# Patient Record
Sex: FEMALE | Race: BLACK OR AFRICAN AMERICAN | NOT HISPANIC OR LATINO | Employment: OTHER | URBAN - METROPOLITAN AREA
[De-identification: names, ages, dates, MRNs, and addresses within clinical notes are randomized per-mention and may not be internally consistent; named-entity substitution may affect disease eponyms.]

---

## 2024-01-19 ENCOUNTER — APPOINTMENT (OUTPATIENT)
Dept: OBSTETRICS AND GYNECOLOGY | Facility: CLINIC | Age: 39
End: 2024-01-19
Payer: COMMERCIAL

## 2024-02-15 ENCOUNTER — OFFICE VISIT (OUTPATIENT)
Dept: OBSTETRICS AND GYNECOLOGY | Facility: CLINIC | Age: 39
End: 2024-02-15
Payer: COMMERCIAL

## 2024-02-15 VITALS
BODY MASS INDEX: 24.39 KG/M2 | SYSTOLIC BLOOD PRESSURE: 111 MMHG | HEART RATE: 94 BPM | WEIGHT: 137.7 LBS | DIASTOLIC BLOOD PRESSURE: 80 MMHG

## 2024-02-15 DIAGNOSIS — Z01.419 WELL WOMAN EXAM: ICD-10-CM

## 2024-02-15 DIAGNOSIS — D50.9 IRON DEFICIENCY ANEMIA, UNSPECIFIED IRON DEFICIENCY ANEMIA TYPE: ICD-10-CM

## 2024-02-15 DIAGNOSIS — Z87.410 HISTORY OF CERVICAL DYSPLASIA: Primary | ICD-10-CM

## 2024-02-15 PROCEDURE — 87800 DETECT AGNT MULT DNA DIREC: CPT

## 2024-02-15 PROCEDURE — 99395 PREV VISIT EST AGE 18-39: CPT | Mod: GC

## 2024-02-15 PROCEDURE — 87624 HPV HI-RISK TYP POOLED RSLT: CPT | Mod: 59

## 2024-02-15 PROCEDURE — 88175 CYTOPATH C/V AUTO FLUID REDO: CPT | Mod: TC,GCY

## 2024-02-15 PROCEDURE — 99395 PREV VISIT EST AGE 18-39: CPT

## 2024-02-15 PROCEDURE — 87661 TRICHOMONAS VAGINALIS AMPLIF: CPT | Mod: 59

## 2024-02-15 RX ORDER — FERROUS SULFATE 325(65) MG
325 TABLET ORAL EVERY OTHER DAY
Qty: 15 TABLET | Refills: 11 | Status: SHIPPED | OUTPATIENT
Start: 2024-02-15 | End: 2025-02-14

## 2024-02-15 RX ORDER — FERROUS SULFATE 325(65) MG
325 TABLET ORAL EVERY OTHER DAY
Qty: 15 TABLET | Refills: 11 | Status: SHIPPED | OUTPATIENT
Start: 2024-02-15 | End: 2024-02-15 | Stop reason: SDUPTHER

## 2024-02-15 ASSESSMENT — ENCOUNTER SYMPTOMS
ALLERGIC/IMMUNOLOGIC NEGATIVE: 0
CONSTITUTIONAL NEGATIVE: 0
MUSCULOSKELETAL NEGATIVE: 0
NEUROLOGICAL NEGATIVE: 0
HEMATOLOGIC/LYMPHATIC NEGATIVE: 0
EYES NEGATIVE: 0
GASTROINTESTINAL NEGATIVE: 0
PSYCHIATRIC NEGATIVE: 0
ENDOCRINE NEGATIVE: 0
CARDIOVASCULAR NEGATIVE: 0
RESPIRATORY NEGATIVE: 0

## 2024-02-15 ASSESSMENT — PAIN SCALES - GENERAL: PAINLEVEL: 0-NO PAIN

## 2024-02-15 NOTE — PROGRESS NOTES
Lavonne Burden is a 38 y.o. No obstetric history on file. female who is here for a routine gyn exam. PCP = No primary care provider on file.    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  Vaccines: unsure if she has gotten gardasil; declines   Exercise/Diet: enjoys working out, particularly walking; reports working to improve diet/skip less meals  Sexual, physical, mental abuse: none    Other Concerns: wants STI testing today; asymptomatic    ROS: otherwise neg    Vitals:    02/15/24 1328   BP: 111/80   Pulse: 94     Physical Exam:    General: no acute distress  HEENT: normocephalic, atraumatic  Heart: warm and well perfused  Lungs: no excessive respiratory effort  Breast: normal breasts; nontender; no palpable masses  Abdomen: soft nontender  : normal external female genitalia, normal vagina and cervix  Extremities: moving all extremities  Neuro: awake and conversant  Psych: appropriate mood and affect    A/P    37 yo  presenting for annual gyn visit    Routine GYN care  -Normal gyn and breast exams  -STI testing today    History of abnormal paps  -Long hx of abnormal paps, 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 Most recent pap  ASC-H/HPV other +;  NILM/HPV neg  - patient previously declined repeat colpo in 2019  -Pap collected today    Iron deficiency anemia  -pt usually takes PO iron, needs new Rx  -Rx sent    Follow up in 1 year or as needed    Seen and dicussed  with Dr. Luz Castro MD PGY-1

## 2024-02-17 LAB
C TRACH RRNA SPEC QL NAA+PROBE: NEGATIVE
N GONORRHOEA DNA SPEC QL PROBE+SIG AMP: NEGATIVE
T VAGINALIS RRNA SPEC QL NAA+PROBE: NEGATIVE

## 2024-03-04 LAB
CYTOLOGY CMNT CVX/VAG CYTO-IMP: NORMAL
HPV HR 12 DNA GENITAL QL NAA+PROBE: NEGATIVE
HPV HR GENOTYPES PNL CVX NAA+PROBE: NEGATIVE
HPV16 DNA SPEC QL NAA+PROBE: NEGATIVE
HPV18 DNA SPEC QL NAA+PROBE: NEGATIVE
LAB AP HPV GENOTYPE QUESTION: YES
LAB AP HPV HR: NORMAL
LAB AP PAP ADDITIONAL TESTS: NORMAL
LABORATORY COMMENT REPORT: NORMAL
LMP START DATE: NORMAL
PATH REPORT.TOTAL CANCER: NORMAL

## 2024-08-13 ENCOUNTER — HOSPITAL ENCOUNTER (EMERGENCY)
Facility: HOSPITAL | Age: 39
Discharge: HOME | End: 2024-08-13
Payer: COMMERCIAL

## 2024-08-13 VITALS
DIASTOLIC BLOOD PRESSURE: 78 MMHG | OXYGEN SATURATION: 99 % | TEMPERATURE: 98.3 F | HEART RATE: 68 BPM | HEIGHT: 63 IN | BODY MASS INDEX: 23.92 KG/M2 | WEIGHT: 135 LBS | SYSTOLIC BLOOD PRESSURE: 132 MMHG | RESPIRATION RATE: 16 BRPM

## 2024-08-13 DIAGNOSIS — H10.13 ALLERGIC CONJUNCTIVITIS OF BOTH EYES: Primary | ICD-10-CM

## 2024-08-13 PROCEDURE — 2500000001 HC RX 250 WO HCPCS SELF ADMINISTERED DRUGS (ALT 637 FOR MEDICARE OP): Mod: SE

## 2024-08-13 PROCEDURE — 2500000005 HC RX 250 GENERAL PHARMACY W/O HCPCS: Mod: SE

## 2024-08-13 PROCEDURE — 99283 EMERGENCY DEPT VISIT LOW MDM: CPT

## 2024-08-13 RX ORDER — IBUPROFEN 600 MG/1
600 TABLET ORAL ONCE
Status: COMPLETED | OUTPATIENT
Start: 2024-08-13 | End: 2024-08-13

## 2024-08-13 RX ORDER — IBUPROFEN 600 MG/1
600 TABLET ORAL EVERY 6 HOURS PRN
Qty: 20 TABLET | Refills: 0 | Status: SHIPPED | OUTPATIENT
Start: 2024-08-13 | End: 2024-08-18

## 2024-08-13 RX ORDER — DIPHENHYDRAMINE HCL 25 MG
25 CAPSULE ORAL ONCE
Status: COMPLETED | OUTPATIENT
Start: 2024-08-13 | End: 2024-08-13

## 2024-08-13 ASSESSMENT — PAIN - FUNCTIONAL ASSESSMENT: PAIN_FUNCTIONAL_ASSESSMENT: 0-10

## 2024-08-13 ASSESSMENT — PAIN DESCRIPTION - FREQUENCY: FREQUENCY: CONSTANT/CONTINUOUS

## 2024-08-13 ASSESSMENT — LIFESTYLE VARIABLES
HAVE YOU EVER FELT YOU SHOULD CUT DOWN ON YOUR DRINKING: NO
EVER FELT BAD OR GUILTY ABOUT YOUR DRINKING: NO
EVER HAD A DRINK FIRST THING IN THE MORNING TO STEADY YOUR NERVES TO GET RID OF A HANGOVER: NO
HAVE PEOPLE ANNOYED YOU BY CRITICIZING YOUR DRINKING: NO
TOTAL SCORE: 0

## 2024-08-13 ASSESSMENT — PAIN DESCRIPTION - PAIN TYPE: TYPE: ACUTE PAIN

## 2024-08-13 ASSESSMENT — COLUMBIA-SUICIDE SEVERITY RATING SCALE - C-SSRS
2. HAVE YOU ACTUALLY HAD ANY THOUGHTS OF KILLING YOURSELF?: NO
2. HAVE YOU ACTUALLY HAD ANY THOUGHTS OF KILLING YOURSELF?: NO
1. IN THE PAST MONTH, HAVE YOU WISHED YOU WERE DEAD OR WISHED YOU COULD GO TO SLEEP AND NOT WAKE UP?: NO
6. HAVE YOU EVER DONE ANYTHING, STARTED TO DO ANYTHING, OR PREPARED TO DO ANYTHING TO END YOUR LIFE?: NO
6. HAVE YOU EVER DONE ANYTHING, STARTED TO DO ANYTHING, OR PREPARED TO DO ANYTHING TO END YOUR LIFE?: NO
1. IN THE PAST MONTH, HAVE YOU WISHED YOU WERE DEAD OR WISHED YOU COULD GO TO SLEEP AND NOT WAKE UP?: NO

## 2024-08-13 ASSESSMENT — PAIN DESCRIPTION - LOCATION: LOCATION: EYE

## 2024-08-13 ASSESSMENT — PAIN SCALES - GENERAL: PAINLEVEL_OUTOF10: 3

## 2024-08-13 NOTE — ED PROVIDER NOTES
HPI   Chief Complaint   Patient presents with    Facial Swelling     Bilateral         Patient is a 38-year-old female presenting to the ED with eye swelling.  Patient endorses waking up this morning with bilateral puffy eyes.  She endorses swelling, itchiness, and irritation.  She states she took off her lash drips yesterday evening which could have causes her symptoms, although states she has used these before.  Patient states she took a dose of Benadryl earlier this morning without significant symptomatic relief.  She does wear glasses at baseline.  She denies any fevers, chills, flulike symptoms, or vision changes.              Patient History   Past Medical History:   Diagnosis Date    Encounter for screening for infections with a predominantly sexual mode of transmission 12/16/2014    Screen for STD (sexually transmitted disease)    Other problems related to lifestyle 12/16/2014    Other problems related to lifestyle    Rash and other nonspecific skin eruption 02/22/2022    Rash of groin     Past Surgical History:   Procedure Laterality Date    COLPOSCOPY  05/18/2016    Colposcopy    CT ANGIO NECK W AND WO IV CONTRAST  10/14/2022    CT NECK ANGIO W AND WO IV CONTRAST 10/14/2022 [facility]     No family history on file.  Social History     Tobacco Use    Smoking status: Not on file    Smokeless tobacco: Not on file   Substance Use Topics    Alcohol use: Not on file    Drug use: Not on file       Physical Exam   ED Triage Vitals   Temperature Heart Rate Respirations BP   08/13/24 1237 08/13/24 1310 08/13/24 1240 08/13/24 1240   36.8 °C (98.2 °F) 72 16 122/84      Pulse Ox Temp Source Heart Rate Source Patient Position   08/13/24 1240 08/13/24 1237 08/13/24 1310 08/13/24 1240   99 % Temporal Monitor Sitting      BP Location FiO2 (%)     08/13/24 1240 --     Left arm        Physical Exam  Vitals reviewed.   Constitutional:       General: She is not in acute distress.     Appearance: She is not  ill-appearing.   Eyes:      Comments: Mild inferior and superior lid edema and erythema.  No pain with EOMs.  No conjunctival involvement.  No evidence of discharge or foreign body.   Cardiovascular:      Rate and Rhythm: Normal rate and regular rhythm.   Pulmonary:      Effort: Pulmonary effort is normal.      Breath sounds: Normal breath sounds.   Musculoskeletal:      Cervical back: Normal range of motion and neck supple. No rigidity or tenderness.   Lymphadenopathy:      Cervical: No cervical adenopathy.   Neurological:      Mental Status: She is alert.           ED Course & MDM   Diagnoses as of 08/13/24 1433   Allergic conjunctivitis of both eyes                 No data recorded     Krystle Coma Scale Score: 15 (08/13/24 1307 : Dane Palacio RN)                           Medical Decision Making  Patient is a 38-year-old female presenting to the ED with eye swelling.  History is obtained from the patient.  Endorses waking up this morning with bilateral swollen, irritated, and itchy eyes after removing her lashes yesterday evening.  She has used his lashes in the past never had this reaction.  Took Benadryl this morning without relief.  Wears glasses.  Denies fevers, flulike symptoms, or vision changes.  On physical exam, patient is sitting comfortably in no apparent distress.  There is mild inferior and superior lid edema and erythema.  No pain with EOMs.  No conjunctival involvement.  No evidence of discharge or foreign body.  Remainder of exam as noted above.  Patient is afebrile and vital signs are stable.    High suspicion the patient symptoms are secondary to the lashes that she recently removed.  No evidence of underlying infectious process at this time.  Patient was treated in the ED with ibuprofen, Benadryl, and artificial tears.  She then remained stable and ready for discharge.  Discharged her with instructions to only wear her glasses.  She should refrain from contact use.  She is not to use the lashes  going forward.  She can continue taking Benadryl as needed.  Otherwise wrote her prescriptions for ibuprofen and lubricating eyedrops.  Patient was educated on proper use of each.  She can use a cool compress on the eyes as needed.  I am hopeful her symptoms will improve within the coming days.  Return precautions were discussed for which she verbalized understanding.  Patient is agreeable to the plan and all of her questions were answered to satisfaction.  She was discharged from the ED in stable condition.        Procedure  Procedures     Latanya Cabrera PA-C  08/13/24 [id number]

## 2024-08-13 NOTE — DISCHARGE INSTRUCTIONS
I believe the eye symptoms you are experiencing is secondary to an allergic reaction from your lashes.  Please refrain from using these.  Also refrain from using your contact lenses.  You can apply cool compresses on the eyes a few times per day for the next couple days.  I would also recommend using Benadryl every 4-6 hours.  I wrote you prescription for ibuprofen which is used for pain.  You can take this every 6 hours as needed.  I also wrote you for eyedrops.  You can administer 2 drops into each eye as needed for irritation.  I am hopeful your symptoms will improve within the coming days.  Please be aware of the symptoms I educated you on that would warrant returning to the ED.

## 2024-08-13 NOTE — ED TRIAGE NOTES
Pt states that she woke this morning at 4am and she had eye irritation and swelling to her bilateral eyes.  She denies any injury to cause this to occur.  Here for eval. Denies any possible allergic reaction, however did take some benedryl with no relief.

## 2024-08-13 NOTE — Clinical Note
Lavonne Burden was seen and treated in our emergency department on 8/13/2024.  She may return to work on 08/15/2024.       If you have any questions or concerns, please don't hesitate to call.      Latanya Cabrera PA-C

## 2025-04-09 ENCOUNTER — OFFICE VISIT (OUTPATIENT)
Dept: OBSTETRICS AND GYNECOLOGY | Facility: CLINIC | Age: 40
End: 2025-04-09
Payer: COMMERCIAL

## 2025-04-09 VITALS
DIASTOLIC BLOOD PRESSURE: 84 MMHG | HEIGHT: 63 IN | WEIGHT: 152 LBS | BODY MASS INDEX: 26.93 KG/M2 | HEART RATE: 92 BPM | SYSTOLIC BLOOD PRESSURE: 122 MMHG

## 2025-04-09 DIAGNOSIS — R10.2 PELVIC PAIN: Primary | ICD-10-CM

## 2025-04-09 DIAGNOSIS — Z11.3 ROUTINE SCREENING FOR STI (SEXUALLY TRANSMITTED INFECTION): ICD-10-CM

## 2025-04-09 DIAGNOSIS — Z32.02 PREGNANCY TEST NEGATIVE: ICD-10-CM

## 2025-04-09 LAB — PREGNANCY TEST URINE, POC: NEGATIVE

## 2025-04-09 PROCEDURE — 3008F BODY MASS INDEX DOCD: CPT | Performed by: STUDENT IN AN ORGANIZED HEALTH CARE EDUCATION/TRAINING PROGRAM

## 2025-04-09 PROCEDURE — 81025 URINE PREGNANCY TEST: CPT | Performed by: STUDENT IN AN ORGANIZED HEALTH CARE EDUCATION/TRAINING PROGRAM

## 2025-04-09 PROCEDURE — 99214 OFFICE O/P EST MOD 30 MIN: CPT | Performed by: STUDENT IN AN ORGANIZED HEALTH CARE EDUCATION/TRAINING PROGRAM

## 2025-04-09 PROCEDURE — 99214 OFFICE O/P EST MOD 30 MIN: CPT | Mod: GC | Performed by: STUDENT IN AN ORGANIZED HEALTH CARE EDUCATION/TRAINING PROGRAM

## 2025-04-09 RX ORDER — NAPROXEN 500 MG/1
500 TABLET ORAL
Qty: 30 TABLET | Refills: 0 | Status: SHIPPED | OUTPATIENT
Start: 2025-04-09 | End: 2025-04-24

## 2025-04-09 ASSESSMENT — ENCOUNTER SYMPTOMS
GASTROINTESTINAL NEGATIVE: 0
ALLERGIC/IMMUNOLOGIC NEGATIVE: 0
ANOREXIA: 0
DYSURIA: 0
FREQUENCY: 0
CHILLS: 0
RESPIRATORY NEGATIVE: 0
ABDOMINAL PAIN: 1
DEPRESSION: 0
BACK PAIN: 0
LOSS OF SENSATION IN FEET: 0
HEMATURIA: 0
CONSTIPATION: 0
FEVER: 0
HEMATOLOGIC/LYMPHATIC NEGATIVE: 0
NAUSEA: 0
MUSCULOSKELETAL NEGATIVE: 0
HEADACHES: 0
NEUROLOGICAL NEGATIVE: 0
FLANK PAIN: 0
CARDIOVASCULAR NEGATIVE: 0
CONSTITUTIONAL NEGATIVE: 0
VOMITING: 0
SORE THROAT: 0
DIARRHEA: 0
EYES NEGATIVE: 0
ENDOCRINE NEGATIVE: 0
PSYCHIATRIC NEGATIVE: 0
OCCASIONAL FEELINGS OF UNSTEADINESS: 0

## 2025-04-09 ASSESSMENT — COLUMBIA-SUICIDE SEVERITY RATING SCALE - C-SSRS
2. HAVE YOU ACTUALLY HAD ANY THOUGHTS OF KILLING YOURSELF?: NO
6. HAVE YOU EVER DONE ANYTHING, STARTED TO DO ANYTHING, OR PREPARED TO DO ANYTHING TO END YOUR LIFE?: NO
1. IN THE PAST MONTH, HAVE YOU WISHED YOU WERE DEAD OR WISHED YOU COULD GO TO SLEEP AND NOT WAKE UP?: NO

## 2025-04-09 ASSESSMENT — PATIENT HEALTH QUESTIONNAIRE - PHQ9
SUM OF ALL RESPONSES TO PHQ9 QUESTIONS 1 AND 2: 0
2. FEELING DOWN, DEPRESSED OR HOPELESS: NOT AT ALL
1. LITTLE INTEREST OR PLEASURE IN DOING THINGS: NOT AT ALL

## 2025-04-09 ASSESSMENT — PAIN SCALES - GENERAL: PAINLEVEL_OUTOF10: 4

## 2025-04-09 NOTE — PROGRESS NOTES
"GYN VISIT  2025     SUBJECTIVE    HPI: Lavonne Burden is a 39 y.o.  presenting for abdominal cramping. LMP 4/3/25. Noticed pain few days ago. Notices intermittent pain. Feels sharp pain when she bends down. Had sex 2 weeks ago. Thinks its related. No n/v. No fevers. Normal Bms - last this morning. Does not strain for bowel movements.      Problem List Items Addressed This Visit       Routine screening for STI (sexually transmitted infection) - Primary    Relevant Orders    Neisseria gonorrhoeae, Amplified, Urogenital    Chlamydia trachomatis, Amplified, Urogenital    Trichomonas vaginalis, Amplified    Pelvic pain    Relevant Medications    naproxen (Naprosyn) 500 mg tablet    Other Relevant Orders    CBC and Auto Differential    Comprehensive Metabolic Panel    US pelvis          Past medical history, surgical history, medications, allergies, family history, social history updated.    OBJECTIVE  Visit Vitals  /84   Pulse 92   Ht 1.6 m (5' 3\")   Wt 68.9 kg (152 lb)   LMP 2025   BMI 26.93 kg/m²   OB Status Having periods   Smoking Status Some Days   BSA 1.75 m²        Physical exam:  Gen: No acute distress  Abd: soft, nontender, nondistended. Neg carnett's. No hernias palpated  : normal appearing external genitalia. Bimanual examination nontender, small, mobile uterus, ovaries nonpalpable. No CMT. No pelvic floor muscle tightness or tenderness to palpation.  Neuro: awake and conversant  Psych: appropriate mood and affect      ASSESSMENT & PLAN    Lavonne Burden is a 39 y.o.  presenting for pelvic pain. The following concerns we addressed today:    Pelvic pain  -no obvious acute gyn or abdominal process identified by exam or history  -will obtain CBC, CMP to rule out acute infection, although suspicion low given non acute exam and patient is afebrile  -POCT pregnancy test neg  -routine STI urine testing ordered  -Pelvic US ordered if pain does not improve over next few " days  -suspect MSK in nature  -naproxen Rxed    RTC as needed or after pelvic US    Patient seen and evaluated with Dr. Dede Castro MD

## 2025-04-10 LAB
C TRACH RRNA SPEC QL NAA+PROBE: NOT DETECTED
N GONORRHOEA RRNA SPEC QL NAA+PROBE: NOT DETECTED
QUEST GC CT AMPLIFIED (ALWAYS MESSAGE): NORMAL
QUEST GC CT AMPLIFIED (ALWAYS MESSAGE): NORMAL
T VAGINALIS RRNA SPEC QL NAA+PROBE: NOT DETECTED

## 2025-04-14 ENCOUNTER — APPOINTMENT (OUTPATIENT)
Dept: RADIOLOGY | Facility: HOSPITAL | Age: 40
End: 2025-04-14
Payer: COMMERCIAL